# Patient Record
(demographics unavailable — no encounter records)

---

## 2025-04-26 NOTE — HISTORY OF PRESENT ILLNESS
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  He had last been seen in the office in 2021 and prior to that in 2018 due to multiple social and medical factors.  He was seen in this office several times for newly diagnosed KS found on biopsy of a left foot lesion in January 2017.  He was restarted on HAART by ID and his lesions overall improved with medication compliance. He has a long-standing history of poor compliance with his daily ARV regimen (currently descovy, tivicay, Mepron, azithromycin).  When he returned to [facility] in April of 2025, he was found to have a low CD4 count (109) and an elevated VL detected.  He was also noted to have worsening of his known KS lesions.  He returned to the office of 4/21/25 to re-establish care.

## 2025-04-28 NOTE — PHYSICAL EXAM
[General Appearance - Alert] : alert [General Appearance - In No Acute Distress] : in no acute distress [FreeTextEntry1] : hearing loss bilateral over past 3 days

## 2025-04-28 NOTE — HISTORY OF PRESENT ILLNESS
[FreeTextEntry1] : reason for visit---chronic AIDS and chronic KS---today pt came in to the office and states lost most of his hearing in both ears in the past 3 days.  VESNA MCRAE is a pleasant 62 year old male being called for a follow-up visit. States lost almost all bilateral vision after surgery. States worsening glaucoma, He has Not been taking his Descovy, Tivicay and Mepron and azithromycin on a regular basis. States keeps forgetting to take doses.and is depressed at times. He needs homecare and his 91 year old mothers homecare attendant has been helping him. His KS appears worse, he needs to see oncology ASAP as well as ophthalmology for second opinion if anything else can be done to help his vision , Last seen here on 21 and prior to that before the last visit was . Pt states he stopped his meds due to family issues and personal issues. States stopped using methamphetamine. Lost weight.Has KS and needs to follow up with Francisco J Nesbitt Restart descovy, tivicay, azithromycin, Mepron daily amanda. Stopped mirtazapine  plan --chronic AIDS and chronic KS and spontaneous loss of bilateral healing  1) needs to go to the [facility] ER now---concern over opportunistic infection ---needs head ct and possible tap---I called the ER referral to let them know he is coming over  2) continue all meds---descovy, tivicay, Mepron, azithromycin daily amanda---see again in 2 weeks ...pharmacy set up for pill trays 3) needs social work to call him, needs assistance at home 4) external provider notes reviewed 5) needs second opinion on blindness from advanced glaucoma---referral given 6) needs follow up with oncology, Dr. Francisco J Arango for KS  VESNA MCRAE is a 54 year old male being seen for a follow-up visit. taking descovy, tivicay, azithromycin, Mepron daily amanda and mirtazapine. Just seen bu Hem/onc on 2017. jaw pain..may be from dental caries.. Pt states not missing ARV doses Patient accompanied by himself. VL..Needs to stay undetectable , presently treated for KS over at [facility].      History of Present Illness  VESNA MCRAE is a 53 year old male being seen for an existing diagnosis of AIDS. Pt recently found out recurrance of Kaposis Syndrome. with [facility] phone # [phone] jessica todd DPM. He was referred to Md. Francisco J Arias at [facility] at [facility] oncology center phone [phone], fax [phone]. He had a CT scan and follow up with Md Arias. Concern over ARV and low CD4. Pt states cant remember if taking ARV daily, needs continued adherance daily with Adherance team with [facility] Nair.    History of Present Illness  pt last seen at [facility] in [place], saw Infectious Disease clinic and [facility]. Lives in [place] in [place], moved over here for closer care. Diagnosed with AIDS Aug 2011 and diagnosed at [facility]. He was also originally at patient here at [facility] ( older scanned records) Now on Tivicay and descovy. Was on TRuvada, prezista, Norvir. Med Hx. AIDS, History of past drug abuse with crystal meth and IV drug use, last drug uses in 2016. Depression and KS on upper and lower extremities. Pt states bisexual sexual orientation. Pt states possible Allergy to Sulfa. Surgical Hx: denies family HX. Father  from Lung Cancer. Mother alive. she is diabetic. Pt states Sulfa allergy. Recent left foot biopsy last week at a dermatologist for spots on the foot. Positive for KS now being seen at [facility]    Sexual History: The patient is not sexually active. The patient is for every encounter. The patient has intercourse with men and women.  STI, Dates, Treatment: denies.  Occupation: on social security, was originally a cab/.  Lives with lives with mother.  Who is aware of HIV status: mother and 2 sisters aware.    Past Medical History  AIDS  KS  pyschiatric disorders    Active Problems  AIDS (042) (B20)  Foot pain (729.5) (M79.673)  KS      Active Problems  AIDS (042) (B20)  Foot pain (729.5) (M79.673)  Kaposi sarcoma (176.9) (C46.9)  Other specified episodic mood disorder (296.99) (F39)  Pain in lower jaw (784.92) (R68.84)    Past Medical History  History of complete eye exam (V15.89) (Z92.89)  History of dental examination (V1) (Z92.89)    Current Meds restarting on 21  Atovaquone 750 MG/5ML Oral Suspension; TAKE 10 ML BY MOUTH DAILY  Azithromycin 600 MG Oral Tablet; TAKE 2 TABLETS BY MOUTH EVERY WEEK  Daily-Amanda Oral Tablet; TAKE 1 TABLET BY MOUTH ONCE DAILY  Descovy 200-25 MG Oral Tablet; Take 1 tablet daily  ITivicay 50 MG Oral Tablet; TAKE 1 TABLET BY MOUTH DAILY    Allergies  Sulfa Drugs

## 2025-04-28 NOTE — ASSESSMENT
[FreeTextEntry1] : plan --chronic AIDS and chronic KS and spontaneous loss of bilateral healing  1) needs to go to the [facility] ER now---concern over opportunistic infection ---needs head ct and possible tap---I called the ER referral to let them know he is coming over  2) continue all meds---descovy, tivicay, Mepron, azithromycin daily amanda---see again in 2 weeks ...pharmacy set up for pill trays 3) needs social work to call him, needs assistance at home 4) external provider notes reviewed 5) needs second opinion on blindness from advanced glaucoma---referral given 6) needs follow up with oncology, Dr. Francisco J Arango for KS [Treatment Education] : treatment education [Treatment Adherence] : treatment adherence [Anticipatory Guidance] : anticipatory guidance